# Patient Record
Sex: MALE | Race: WHITE
[De-identification: names, ages, dates, MRNs, and addresses within clinical notes are randomized per-mention and may not be internally consistent; named-entity substitution may affect disease eponyms.]

---
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
## 2025-01-14 NOTE — US
EXAMINATION TYPE: US gallbladder

 

DATE OF EXAM: 1/14/2025

 

COMPARISON: NONE

 

CLINICAL INDICATION: Male, 19 years old with history of R11.2 Nausea with vomiting; Pain nausea and v
omiting. 

 

TECHNIQUE: Grayscale and color Doppler imaging of the right upper quadrant was performed.

 

FINDINGS:

 

EXAM MEASUREMENTS:

 

Liver Length:  14.1 cm   

Gallbladder Wall:  .2 cm   

CBD:  .4 cm

Right Kidney:  10.4 x 4.9 x 4.0  cm

 

SONOGRAPHER NOTES:

 

Pancreas:  Obscured by bowel gas

Liver:  Increased attenuation  

Gallbladder:  No stones seen

**Evidence for sonographic Valenzuela's sign:  No

CBD:  wnl 

Right Kidney:  No hydronephrosis or masses seen 

 

 

 

IMPRESSION: 

 

1. Unremarkable right upper quadrant ultrasound

 

[facility], Workstation: [id number], 1/14/2025 8:12 AM 17-Aug-2019 01:38

## 2025-01-28 NOTE — NM
EXAMINATION TYPE: NM hepatobiliary w EF

 

DATE OF EXAM: 1/28/2025

 

COMPARISON: NONE

 

CLINICAL INDICATION: Male, 19 years old with history of R10.11 RIGHT UPPER QUADRANT PAIN;

 

TECHNIQUE: After the intravenous administration of 5.24 mCi Tc 99m Mebrofenin hepatobiliary scintigra
phy is performed.  Immediate images post injection.

 

FINDINGS: 

There is satisfactory initial accumulation of tracer by the liver.  The gallbladder is visualized wit
hin less than 1 minute. The small bowel activity is noted within 26 minutes.  At one hour 8 ounces of
 oral ensure plus is given to mimic CCK and gallbladder ejection fraction is calculated at 74 %, in t
he normal range.  Therefore there is no scintigraphic evidence of cystic or common bile duct obstruct
ion to suggest acute cholecystitis or gallbladder dyskinesia. 

 

IMPRESSION: Exam is within normal limits.

 

 

[facility], Workstation: [id number], 1/28/2025 5:26 PM